# Patient Record
Sex: MALE | Race: WHITE
[De-identification: names, ages, dates, MRNs, and addresses within clinical notes are randomized per-mention and may not be internally consistent; named-entity substitution may affect disease eponyms.]

---

## 2019-09-12 ENCOUNTER — HOSPITAL ENCOUNTER (EMERGENCY)
Dept: HOSPITAL 62 - ER | Age: 39
LOS: 1 days | Discharge: HOME | End: 2019-09-13
Payer: OTHER GOVERNMENT
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
## 2019-09-12 DIAGNOSIS — S91.052A: Primary | ICD-10-CM

## 2019-09-12 DIAGNOSIS — F17.210: ICD-10-CM

## 2019-09-12 LAB
ADD MANUAL DIFF: NO
ADD MANUAL MICROSCOPIC: YES
AMORPH SED URNS QL MICRO: (no result)
ANION GAP SERPL CALC-SCNC: 8 MMOL/L (ref 5–19)
APPEARANCE UR: (no result)
APTT BLD: 25.5 SEC (ref 23.5–35.8)
APTT PPP: YELLOW S
BASOPHILS # BLD AUTO: 0.1 10^3/UL (ref 0–0.2)
BASOPHILS NFR BLD AUTO: 0.7 % (ref 0–2)
BILIRUB UR QL STRIP: NEGATIVE
BUN SERPL-MCNC: 12 MG/DL (ref 7–20)
CALCIUM: 9.5 MG/DL (ref 8.4–10.2)
CHLORIDE SERPL-SCNC: 104 MMOL/L (ref 98–107)
CK SERPL-CCNC: 126 U/L (ref 55–170)
CO2 SERPL-SCNC: 28 MMOL/L (ref 22–30)
D DIMER PPP FEU-MCNC: 0.6 UG/ML (ref 0–0.5)
EOSINOPHIL # BLD AUTO: 0.3 10^3/UL (ref 0–0.6)
EOSINOPHIL NFR BLD AUTO: 3.8 % (ref 0–6)
ERYTHROCYTE [DISTWIDTH] IN BLOOD BY AUTOMATED COUNT: 12.8 % (ref 11.5–14)
FIBRINOGEN PPP-MCNC: 412 MG/DL (ref 209–497)
GLUCOSE SERPL-MCNC: 99 MG/DL (ref 75–110)
GLUCOSE UR STRIP-MCNC: NEGATIVE MG/DL
HCT VFR BLD CALC: 46.5 % (ref 37.9–51)
HGB BLD-MCNC: 16.2 G/DL (ref 13.5–17)
INR PPP: 0.84
KETONES UR STRIP-MCNC: NEGATIVE MG/DL
LYMPHOCYTES # BLD AUTO: 2.8 10^3/UL (ref 0.5–4.7)
LYMPHOCYTES NFR BLD AUTO: 31.8 % (ref 13–45)
MCH RBC QN AUTO: 31.3 PG (ref 27–33.4)
MCHC RBC AUTO-ENTMCNC: 34.8 G/DL (ref 32–36)
MCV RBC AUTO: 90 FL (ref 80–97)
MONOCYTES # BLD AUTO: 0.6 10^3/UL (ref 0.1–1.4)
MONOCYTES NFR BLD AUTO: 6.9 % (ref 3–13)
NEUTROPHILS # BLD AUTO: 5.1 10^3/UL (ref 1.7–8.2)
NEUTS SEG NFR BLD AUTO: 56.8 % (ref 42–78)
NITRITE UR QL STRIP: NEGATIVE
PH UR STRIP: 7 [PH] (ref 5–9)
PLATELET # BLD: 283 10^3/UL (ref 150–450)
POTASSIUM SERPL-SCNC: 3.8 MMOL/L (ref 3.6–5)
PROT UR STRIP-MCNC: NEGATIVE MG/DL
PROTHROMBIN TIME: 11.5 SEC (ref 11.4–15.4)
RBC # BLD AUTO: 5.17 10^6/UL (ref 4.35–5.55)
SP GR UR STRIP: 1.01
TOTAL CELLS COUNTED % (AUTO): 100 %
UROBILINOGEN UR-MCNC: NEGATIVE MG/DL (ref ?–2)
WBC # BLD AUTO: 8.9 10^3/UL (ref 4–10.5)

## 2019-09-12 PROCEDURE — 81001 URINALYSIS AUTO W/SCOPE: CPT

## 2019-09-12 PROCEDURE — 85025 COMPLETE CBC W/AUTO DIFF WBC: CPT

## 2019-09-12 PROCEDURE — 82550 ASSAY OF CK (CPK): CPT

## 2019-09-12 PROCEDURE — 99284 EMERGENCY DEPT VISIT MOD MDM: CPT

## 2019-09-12 PROCEDURE — 80048 BASIC METABOLIC PNL TOTAL CA: CPT

## 2019-09-12 PROCEDURE — 36415 COLL VENOUS BLD VENIPUNCTURE: CPT

## 2019-09-12 PROCEDURE — 85610 PROTHROMBIN TIME: CPT

## 2019-09-12 PROCEDURE — 85730 THROMBOPLASTIN TIME PARTIAL: CPT

## 2019-09-12 PROCEDURE — 85379 FIBRIN DEGRADATION QUANT: CPT

## 2019-09-12 PROCEDURE — 85384 FIBRINOGEN ACTIVITY: CPT

## 2019-09-12 PROCEDURE — 83874 ASSAY OF MYOGLOBIN: CPT

## 2019-09-12 NOTE — ER DOCUMENT REPORT
ED Medical Screen (RME)





- General


Chief Complaint: Snake Bite


Stated Complaint: POSSIBLE SNAKE BITE/FOOT PAIN


Time Seen by Provider: 09/12/19 21:44


Mode of Arrival: Ambulatory


Information source: Patient


Notes: 





38-year-old male presented to ED for complaint of being bit by a snake about 25 

minutes before coming to the ED.  He states he felt something bite him at the 

time but it does not feel bad at this time.  There is no swelling noted there 

are teeth were noted.  Patient is alert oriented respirations regular and 

unlabored speaking in full sentences.  Snake bite protocol was started.














I have greeted and performed a rapid initial assessment of this patient.  A 

comprehensive ED assessment and evaluation of the patient, analysis of test 

results and completion of medical decision making process will be conducted by 

an additional ED providers.





- Related Data


Allergies/Adverse Reactions: 


                                        





No Known Allergies Allergy (Unverified 09/12/19 21:38)


   











Past Medical History





- Social History


Chew tobacco use (# tins/day): No


Frequency of alcohol use: None


Drug Abuse: None





Physical Exam





- Vital signs


Vitals: 





                                        











Temp Pulse Resp BP Pulse Ox


 


 98.5 F   82   18   116/75   94 


 


 09/12/19 21:28  09/12/19 21:28  09/12/19 21:28  09/12/19 21:28  09/12/19 21:28














Course





- Vital Signs


Vital signs: 





                                        











Temp Pulse Resp BP Pulse Ox


 


 98.5 F   82   18   116/75   94 


 


 09/12/19 21:28  09/12/19 21:28  09/12/19 21:28  09/12/19 21:28  09/12/19 21:28

## 2019-09-13 VITALS — SYSTOLIC BLOOD PRESSURE: 123 MMHG | DIASTOLIC BLOOD PRESSURE: 78 MMHG

## 2019-09-13 NOTE — ER DOCUMENT REPORT
ED General





- General


Chief Complaint: Snake Bite


Stated Complaint: POSSIBLE SNAKE BITE/FOOT PAIN


Time Seen by Provider: 09/12/19 21:44


Primary Care Provider: 


[facility] [Primary Care Provider] - Follow up as needed


Mode of Arrival: Ambulatory


Notes: 





Patient is a 38-year-old male previously healthy that presents to the emergency 

department for chief complaint of snakebite.  Patient reports that he was 

walking through grass, and a small 4 inch copperhead bit him in the left ankle. 

This occurred around 9 PM this evening.  He did not notice any redness with it, 

but did feel the bite, it aches somewhat, rates his pain as a 1 out of 10 at 

this time.  He has not noticed any redness or streaking up the leg.  Denies any 

fever, chills, nausea, abdominal pain, chest pain or palpitations.  He did not 

notice any bruising either.





Past Medical History: Denies chronic medical conditions


Past Surgical History: Denies any pertinent or recent surgical history


Social History: Admits to smoking cigarettes, denies alcohol or illicit drug 

use.


Family History: Reviewed and noncontributory for presenting illness


Allergies: Reviewed, see documented allergy list. 





REVIEW OF SYSTEMS:


Other than noted above, the 12 point review of systems was reviewed with the 

patient and were negative, all pertinent findings are included in the HPI.





PHYSICAL EXAMINATION:





Vital signs reviewed, nursing noted reviewed. 





GENERAL: Well-appearing, well-nourished and in no acute distress.





HEAD: Atraumatic, normocephalic.





EYES: Eyes appear normal, extraocular movements intact, sclera anicteric, 

conjunctiva are normal.





ENT: nares patent, oropharynx clear without exudates.  Moist mucous membranes.





NECK: Normal range of motion, supple without lymphadenopathy





LUNGS: Breath sounds clear to auscultation bilaterally and equal.  No wheezes 

rales or rhonchi.





HEART: Regular rate and rhythm without murmurs





ABDOMEN: Soft, nontender, normoactive bowel sounds.  No rebound, guarding, or 

rigidity. No masses appreciated.





EXTREMITIES: Nontender, good range of motion, no pitting or edema.  





NEUROLOGICAL: No focal neurological deficits. Moves all extremities 

spontaneously Motor and sensory grossly intact on exam.





PSYCH: Normal mood, normal affect.





SKIN: Warm, Dry, normal turgor, on the left lateral ankle, there is a punctate 

brook noted, from snakebite, there is no surrounding erythema, there is very 

minimal induration, and essentially no tenderness to palpation to the area, no 

ecchymosis or erythema or streaking noted.








- Related Data


Allergies/Adverse Reactions: 


                                        





No Known Allergies Allergy (Unverified 09/12/19 21:38)


   











Past Medical History





- General


Information source: Patient





- Social History


Smoking Status: Current Every Day Smoker


Chew tobacco use (# tins/day): No


Frequency of alcohol use: None


Drug Abuse: None


Family History: Reviewed & Not Pertinent


Patient has suicidal ideation: No


Patient has homicidal ideation: No





Physical Exam





- Vital signs


Vitals: 


                                        











Temp Pulse Resp BP Pulse Ox


 


 98.5 F   82   18   116/75   94 


 


 09/12/19 21:28  09/12/19 21:28  09/12/19 21:28  09/12/19 21:28  09/12/19 21:28














Course





- Re-evaluation


Re-evalutation: 





Patient seen and examined vital signs reviewed. 





Patient was evaluated and treated as appropriate for the patient's presenting 

symptoms and complaint, with consideration of any critical or life threatening 

conditions that may be associated with their obtained history and exam as noted 

above.








The patient was re-evaluated and was stable, no worsening of the bite area, his 

blood work was unremarkable, at this point I do not feel there is a need to 

repeat blood work, this is most likely a dry bite, is from a very small snake, 

and patient appears well.





Evaluation was most consistent with snakebite.





Plan of care was discussed with the patient at this point, after careful 

consideration I feel that that patient can be discharged from the emergency 

department, the patient was educated treatments and reasons to return to the 

emergency department based on their presumed diagnosis as noted above, they were

advised to followup with a primary care physician in 2-3 days. Patient was 

agreeable to plan of care.





*Note is created using voice recognition software and may contain spelling, 

syntax or grammatical errors.








Laboratory











  09/12/19 09/12/19 09/12/19





  21:44 21:44 22:15


 


WBC  8.9  


 


RBC  5.17  


 


Hgb  16.2  


 


Hct  46.5  


 


MCV  90  


 


MCH  31.3  


 


MCHC  34.8  


 


RDW  12.8  


 


Plt Count  283  


 


Lymph % (Auto)  31.8  


 


Mono % (Auto)  6.9  


 


Eos % (Auto)  3.8  


 


Baso % (Auto)  0.7  


 


Absolute Neuts (auto)  5.1  


 


Absolute Lymphs (auto)  2.8  


 


Absolute Monos (auto)  0.6  


 


Absolute Eos (auto)  0.3  


 


Absolute Basos (auto)  0.1  


 


Seg Neutrophils %  56.8  


 


PT   11.5 


 


INR   0.84 


 


APTT   25.5 


 


Fibrinogen   412 


 


D-Dimer   0.60 H 


 


Sodium    139.6


 


Potassium    3.8


 


Chloride    104


 


Carbon Dioxide    28


 


Anion Gap    8


 


BUN    12


 


Creatinine    0.84


 


Est GFR ( Amer)    > 60


 


Est GFR (MDRD) Non-Af    > 60


 


Glucose    99


 


Calcium    9.5


 


Creatine Kinase    126


 


Urine Color   


 


Urine Appearance   


 


Urine pH   


 


Ur Specific Gravity   


 


Urine Protein   


 


Urine Glucose (UA)   


 


Urine Ketones   


 


Urine Blood   


 


Urine Nitrite   


 


Urine Bilirubin   


 


Urine Urobilinogen   


 


Ur Leukocyte Esterase   


 


Amorphous Sediment   


 


Urine Mucus   


 


Urine Ascorbic Acid   














  09/12/19





  22:21


 


WBC 


 


RBC 


 


Hgb 


 


Hct 


 


MCV 


 


MCH 


 


MCHC 


 


RDW 


 


Plt Count 


 


Lymph % (Auto) 


 


Mono % (Auto) 


 


Eos % (Auto) 


 


Baso % (Auto) 


 


Absolute Neuts (auto) 


 


Absolute Lymphs (auto) 


 


Absolute Monos (auto) 


 


Absolute Eos (auto) 


 


Absolute Basos (auto) 


 


Seg Neutrophils % 


 


PT 


 


INR 


 


APTT 


 


Fibrinogen 


 


D-Dimer 


 


Sodium 


 


Potassium 


 


Chloride 


 


Carbon Dioxide 


 


Anion Gap 


 


BUN 


 


Creatinine 


 


Est GFR ( Amer) 


 


Est GFR (MDRD) Non-Af 


 


Glucose 


 


Calcium 


 


Creatine Kinase 


 


Urine Color  YELLOW


 


Urine Appearance  SLIGHTLY-CLOUDY


 


Urine pH  7.0


 


Ur Specific [place]  1.011


 


Urine Protein  NEGATIVE


 


Urine Glucose (UA)  NEGATIVE


 


Urine Ketones  NEGATIVE


 


Urine Blood  NEGATIVE


 


Urine Nitrite  NEGATIVE


 


Urine Bilirubin  NEGATIVE


 


Urine Urobilinogen  NEGATIVE


 


Ur Leukocyte Esterase  NEGATIVE


 


Amorphous Sediment  1+


 


Urine Mucus  TRACE


 


Urine Ascorbic Acid  NEGATIVE














- Vital Signs


Vital signs: 


                                        











Temp Pulse Resp BP Pulse Ox


 


 98.5 F   82   18   116/75   94 


 


 09/12/19 21:28  09/12/19 21:28  09/12/19 21:28  09/12/19 21:28  09/12/19 21:28














- Laboratory


Result Diagrams: 


                                 09/12/19 21:44





                                 09/12/19 22:15


Laboratory results interpreted by me: 


                                        











  09/12/19





  21:44


 


D-Dimer  0.60 H














Discharge





- Discharge


Clinical Impression: 


Snake bite


Qualifiers:


 Encounter type: initial encounter Qualified Code(s): W59.11XA - Bitten by 

nonvenomous snake, initial encounter





Condition: Stable


Disposition: HOME, SELF-CARE


Instructions:  Snakebites ([facility])


Additional Instructions: 


Please monitor for signs of worsening such as redness or bruising streaking up 

your leg.  I would not anticipate this, however it is still possible, if you 

notice these things, please return to the emergency department immediately.


Referrals: 


[facility] [Primary Care Provider] - Follow up as needed